# Patient Record
Sex: MALE | Race: WHITE | Employment: FULL TIME | URBAN - NONMETROPOLITAN AREA
[De-identification: names, ages, dates, MRNs, and addresses within clinical notes are randomized per-mention and may not be internally consistent; named-entity substitution may affect disease eponyms.]

---

## 2022-03-26 ENCOUNTER — HOSPITAL ENCOUNTER (EMERGENCY)
Age: 57
Discharge: LEFT BEFORE DISCHARGE | End: 2022-03-26
Attending: EMERGENCY MEDICINE
Payer: COMMERCIAL

## 2022-03-26 ENCOUNTER — APPOINTMENT (OUTPATIENT)
Dept: GENERAL RADIOLOGY | Age: 57
End: 2022-03-26
Payer: COMMERCIAL

## 2022-03-26 ENCOUNTER — APPOINTMENT (OUTPATIENT)
Dept: CT IMAGING | Age: 57
End: 2022-03-26
Payer: COMMERCIAL

## 2022-03-26 ENCOUNTER — HOSPITAL ENCOUNTER (EMERGENCY)
Age: 57
Discharge: HOME | End: 2022-03-27
Attending: EMERGENCY MEDICINE
Payer: COMMERCIAL

## 2022-03-26 VITALS
HEART RATE: 104 BPM | RESPIRATION RATE: 18 BRPM | DIASTOLIC BLOOD PRESSURE: 94 MMHG | SYSTOLIC BLOOD PRESSURE: 134 MMHG | OXYGEN SATURATION: 97 %

## 2022-03-26 VITALS
DIASTOLIC BLOOD PRESSURE: 85 MMHG | RESPIRATION RATE: 21 BRPM | TEMPERATURE: 98 F | OXYGEN SATURATION: 97 % | HEART RATE: 86 BPM | SYSTOLIC BLOOD PRESSURE: 132 MMHG

## 2022-03-26 DIAGNOSIS — R77.8 ELEVATED TROPONIN: ICD-10-CM

## 2022-03-26 DIAGNOSIS — R42 DIZZINESS: Primary | ICD-10-CM

## 2022-03-26 LAB
ABSOLUTE EOS #: 0.19 K/UL (ref 0–0.44)
ABSOLUTE IMMATURE GRANULOCYTE: <0.03 K/UL (ref 0–0.3)
ABSOLUTE LYMPH #: 2.43 K/UL (ref 1.1–3.7)
ABSOLUTE MONO #: 0.47 K/UL (ref 0.1–1.2)
ALBUMIN SERPL-MCNC: 4.5 G/DL (ref 3.5–5.2)
ALBUMIN/GLOBULIN RATIO: 1.6 (ref 1–2.5)
ALP BLD-CCNC: 73 U/L (ref 40–129)
ALT SERPL-CCNC: 6 U/L (ref 5–41)
AMPHETAMINE SCREEN URINE: NEGATIVE
ANION GAP SERPL CALCULATED.3IONS-SCNC: 11 MMOL/L (ref 9–17)
AST SERPL-CCNC: 11 U/L
BARBITURATE SCREEN URINE: NEGATIVE
BASOPHILS # BLD: 1 % (ref 0–2)
BASOPHILS ABSOLUTE: 0.06 K/UL (ref 0–0.2)
BENZODIAZEPINE SCREEN, URINE: NEGATIVE
BILIRUB SERPL-MCNC: 0.35 MG/DL (ref 0.3–1.2)
BUN BLDV-MCNC: 16 MG/DL (ref 6–20)
BUN/CREAT BLD: 13 (ref 9–20)
BUPRENORPHINE URINE: NEGATIVE
CALCIUM SERPL-MCNC: 9.6 MG/DL (ref 8.6–10.4)
CANNABINOID SCREEN URINE: NEGATIVE
CHLORIDE BLD-SCNC: 99 MMOL/L (ref 98–107)
CO2: 28 MMOL/L (ref 20–31)
COCAINE METABOLITE, URINE: NEGATIVE
CREAT SERPL-MCNC: 1.27 MG/DL (ref 0.7–1.2)
D-DIMER QUANTITATIVE: 0.67 MG/L FEU (ref 0–0.59)
EKG ATRIAL RATE: 98 BPM
EKG P AXIS: 38 DEGREES
EKG P-R INTERVAL: 162 MS
EKG Q-T INTERVAL: 372 MS
EKG QRS DURATION: 84 MS
EKG QTC CALCULATION (BAZETT): 474 MS
EKG R AXIS: -6 DEGREES
EKG T AXIS: -32 DEGREES
EKG VENTRICULAR RATE: 98 BPM
EOSINOPHILS RELATIVE PERCENT: 2 % (ref 1–4)
GFR AFRICAN AMERICAN: >60 ML/MIN
GFR NON-AFRICAN AMERICAN: 58 ML/MIN
GFR SERPL CREATININE-BSD FRML MDRD: ABNORMAL ML/MIN/{1.73_M2}
GFR SERPL CREATININE-BSD FRML MDRD: ABNORMAL ML/MIN/{1.73_M2}
GLUCOSE BLD-MCNC: 116 MG/DL (ref 70–99)
HCT VFR BLD CALC: 44.9 % (ref 40.7–50.3)
HEMOGLOBIN: 15.3 G/DL (ref 13–17)
IMMATURE GRANULOCYTES: 0 %
INR BLD: 0.9
INR BLD: 1
LYMPHOCYTES # BLD: 28 % (ref 24–43)
MAGNESIUM: 2.1 MG/DL (ref 1.6–2.6)
MCH RBC QN AUTO: 30.2 PG (ref 25.2–33.5)
MCHC RBC AUTO-ENTMCNC: 34.1 G/DL (ref 28.4–34.8)
MCV RBC AUTO: 88.7 FL (ref 82.6–102.9)
METHADONE SCREEN, URINE: NEGATIVE
METHAMPHETAMINE, URINE: NEGATIVE
MONOCYTES # BLD: 5 % (ref 3–12)
NRBC AUTOMATED: 0 PER 100 WBC
OPIATES, URINE: NEGATIVE
OXYCODONE SCREEN URINE: NEGATIVE
PDW BLD-RTO: 13.1 % (ref 11.8–14.4)
PHENCYCLIDINE, URINE: NEGATIVE
PLATELET # BLD: 343 K/UL (ref 138–453)
PMV BLD AUTO: 9.4 FL (ref 8.1–13.5)
POTASSIUM SERPL-SCNC: 3.5 MMOL/L (ref 3.7–5.3)
PRO-BNP: 956 PG/ML
PROPOXYPHENE, URINE: NEGATIVE
PROTHROMBIN TIME: 11.9 SEC (ref 11.5–14.2)
PROTHROMBIN TIME: 12.7 SEC (ref 11.5–14.2)
RBC # BLD: 5.06 M/UL (ref 4.21–5.77)
SEG NEUTROPHILS: 64 % (ref 36–65)
SEGMENTED NEUTROPHILS ABSOLUTE COUNT: 5.62 K/UL (ref 1.5–8.1)
SODIUM BLD-SCNC: 138 MMOL/L (ref 135–144)
TOTAL PROTEIN: 7.3 G/DL (ref 6.4–8.3)
TRICYCLIC ANTIDEPRESSANTS, UR: NEGATIVE
TROPONIN, HIGH SENSITIVITY: 154 NG/L (ref 0–22)
TROPONIN, HIGH SENSITIVITY: 155 NG/L (ref 0–22)
TROPONIN, HIGH SENSITIVITY: 161 NG/L (ref 0–22)
TSH SERPL DL<=0.05 MIU/L-ACNC: 0.47 MIU/L (ref 0.3–5)
WBC # BLD: 8.8 K/UL (ref 3.5–11.3)

## 2022-03-26 PROCEDURE — 83880 ASSAY OF NATRIURETIC PEPTIDE: CPT

## 2022-03-26 PROCEDURE — 71046 X-RAY EXAM CHEST 2 VIEWS: CPT

## 2022-03-26 PROCEDURE — 70450 CT HEAD/BRAIN W/O DYE: CPT

## 2022-03-26 PROCEDURE — 85610 PROTHROMBIN TIME: CPT

## 2022-03-26 PROCEDURE — 96374 THER/PROPH/DIAG INJ IV PUSH: CPT

## 2022-03-26 PROCEDURE — 71260 CT THORAX DX C+: CPT

## 2022-03-26 PROCEDURE — 99283 EMERGENCY DEPT VISIT LOW MDM: CPT

## 2022-03-26 PROCEDURE — 80053 COMPREHEN METABOLIC PANEL: CPT

## 2022-03-26 PROCEDURE — 84443 ASSAY THYROID STIM HORMONE: CPT

## 2022-03-26 PROCEDURE — 36415 COLL VENOUS BLD VENIPUNCTURE: CPT

## 2022-03-26 PROCEDURE — 93005 ELECTROCARDIOGRAM TRACING: CPT | Performed by: PHYSICIAN ASSISTANT

## 2022-03-26 PROCEDURE — 93005 ELECTROCARDIOGRAM TRACING: CPT | Performed by: EMERGENCY MEDICINE

## 2022-03-26 PROCEDURE — 2580000003 HC RX 258: Performed by: PHYSICIAN ASSISTANT

## 2022-03-26 PROCEDURE — 85025 COMPLETE CBC W/AUTO DIFF WBC: CPT

## 2022-03-26 PROCEDURE — 6360000002 HC RX W HCPCS: Performed by: PHYSICIAN ASSISTANT

## 2022-03-26 PROCEDURE — 96375 TX/PRO/DX INJ NEW DRUG ADDON: CPT

## 2022-03-26 PROCEDURE — 84484 ASSAY OF TROPONIN QUANT: CPT

## 2022-03-26 PROCEDURE — 80306 DRUG TEST PRSMV INSTRMNT: CPT

## 2022-03-26 PROCEDURE — 6360000004 HC RX CONTRAST MEDICATION: Performed by: PHYSICIAN ASSISTANT

## 2022-03-26 PROCEDURE — 85379 FIBRIN DEGRADATION QUANT: CPT

## 2022-03-26 PROCEDURE — 99284 EMERGENCY DEPT VISIT MOD MDM: CPT

## 2022-03-26 PROCEDURE — 83735 ASSAY OF MAGNESIUM: CPT

## 2022-03-26 RX ORDER — ONDANSETRON 2 MG/ML
4 INJECTION INTRAMUSCULAR; INTRAVENOUS ONCE
Status: COMPLETED | OUTPATIENT
Start: 2022-03-26 | End: 2022-03-26

## 2022-03-26 RX ORDER — MIRTAZAPINE 15 MG/1
15 TABLET, FILM COATED ORAL DAILY
COMMUNITY

## 2022-03-26 RX ORDER — 0.9 % SODIUM CHLORIDE 0.9 %
1000 INTRAVENOUS SOLUTION INTRAVENOUS ONCE
Status: COMPLETED | OUTPATIENT
Start: 2022-03-26 | End: 2022-03-26

## 2022-03-26 RX ORDER — BUSPIRONE HYDROCHLORIDE 5 MG/1
5 TABLET ORAL DAILY
COMMUNITY

## 2022-03-26 RX ORDER — DIPHENHYDRAMINE HYDROCHLORIDE 50 MG/ML
25 INJECTION INTRAMUSCULAR; INTRAVENOUS ONCE
Status: COMPLETED | OUTPATIENT
Start: 2022-03-26 | End: 2022-03-26

## 2022-03-26 RX ADMIN — DIPHENHYDRAMINE HYDROCHLORIDE 25 MG: 50 INJECTION, SOLUTION INTRAMUSCULAR; INTRAVENOUS at 15:31

## 2022-03-26 RX ADMIN — ONDANSETRON 4 MG: 2 INJECTION INTRAMUSCULAR; INTRAVENOUS at 15:30

## 2022-03-26 RX ADMIN — SODIUM CHLORIDE 999 ML: 9 INJECTION, SOLUTION INTRAVENOUS at 15:29

## 2022-03-26 RX ADMIN — IOPAMIDOL 75 ML: 755 INJECTION, SOLUTION INTRAVENOUS at 16:05

## 2022-03-26 ASSESSMENT — ENCOUNTER SYMPTOMS
EYES NEGATIVE: 1
GASTROINTESTINAL NEGATIVE: 1

## 2022-03-26 NOTE — ED PROVIDER NOTES
[facility] ED  EMERGENCY DEPARTMENT ENCOUNTER      Pt Name: Donnie Caldwell  MRN: [id number]  [place] 1965  Date of evaluation: 3/26/2022  Provider: JAVON Pride PA-C    CHIEF COMPLAINT     Chief Complaint   Patient presents with    Dizziness     ongoing for years, worse since mon         HISTORY OF PRESENT ILLNESS   (Location/Symptom, Timing/Onset, Context/Setting,Quality, Duration, Modifying Factors, Severity)  Note limiting factors. Donnie Caldwell is a62 y.o. male who presents to the emergency department      76-year-old male presents here with a chief complaint of a 1 week history of dizziness. Patient states he also has a history of dizziness but in the last week he is got progressively worse. Walking through [place] today felt as if he was going to pass out and not make it. He presents here with a chief complaint of dizziness. Denies any chest pain or shortness of breath. Family states he had a recent history of an upper respiratory infection Covid was negative at that time. Patient also has a history of hypertension. States she also had a stroke at a young age. He is not currently on any anticoagulant therapy. Patient is afebrile vital signs are stable. Nursing Notes werereviewed. REVIEW OF SYSTEMS    (2-9 systems for level 4, 10 or more for level 5)     Review of Systems   Constitutional: Negative. HENT: Negative. Eyes: Negative. Cardiovascular: Negative. Gastrointestinal: Negative. Endocrine: Negative. Genitourinary: Negative. Musculoskeletal: Negative. Neurological: Positive for dizziness and light-headedness. Negative for speech difficulty. Psychiatric/Behavioral: Negative. All other systems reviewed and are negative. Except as noted above the remainder of the review of systems was reviewed and negative.        PAST MEDICAL HISTORY     Past Medical History:   Diagnosis Date    Anxiety     Hypertension     Stroke (cerebrum) ([facility] [address] SURGICALHISTORY     No past surgical history on file. CURRENT MEDICATIONS       Previous Medications    BUSPIRONE (BUSPAR) 5 MG TABLET    Take 5 mg by mouth 3 times daily    LOSARTAN POTASSIUM PO    Take by mouth    MIRTAZAPINE (REMERON) 15 MG TABLET    Take 15 mg by mouth nightly         ALLERGIES   Patient has no known allergies. FAMILY HISTORY     No family history on file. SOCIAL HISTORY       Social History     Socioeconomic History    Marital status: Unknown     Spouse name: Not on file    Number of children: Not on file    Years of education: Not on file    Highest education level: Not on file   Occupational History    Not on file   Tobacco Use    Smoking status: Current Every Day Smoker     Packs/day: 1.00    Smokeless tobacco: Never Used   Vaping Use    Vaping Use: Never used   Substance and Sexual Activity    Alcohol use: Not Currently    Drug use: Not on file    Sexual activity: Yes   Other Topics Concern    Not on file   Social History Narrative    Not on file     Social Determinants of Health     Financial Resource Strain:     Difficulty of Paying Living Expenses: Not on file   Food Insecurity:     Worried About Running Out of Food in the Last Year: Not on file    Jeannette of Food in the Last Year: Not on file   Transportation Needs:     Lack of Transportation (Medical): Not on file    Lack of Transportation (Non-Medical):  Not on file   Physical Activity:     Days of Exercise per Week: Not on file    Minutes of Exercise per Session: Not on file   Stress:     Feeling of Stress : Not on file   Social Connections:     Frequency of Communication with Friends and Family: Not on file    Frequency of Social Gatherings with Friends and Family: Not on file    Attends [employer] Services: Not on file    Active Member of Clubs or Organizations: Not on file    Attends Club or Organization Meetings: Not on file    Marital Status: Not on file   Intimate Partner Violence:  Fear of Current or Ex-Partner: Not on file    Emotionally Abused: Not on file    Physically Abused: Not on file    Sexually Abused: Not on file   Housing Stability:     Unable to Pay for Housing in the Last Year: Not on file    Number of Places Lived in the Last Year: Not on file    Unstable Housing in the Last Year: Not on file       SCREENINGS    [place] Coma Scale  Eye Opening: Spontaneous  Best Verbal Response: Oriented  Best Motor Response: Obeys commands  [place] Coma Scale Score: 15        PHYSICAL EXAM    (up to 7 for level 4, 8 or more for level 5)     ED Triage Vitals [03/26/22 1442]   BP Temp Temp src Pulse Resp SpO2 Height Weight   (!) 182/116 -- -- 105 18 100 % -- --       Physical Exam  Vitals and nursing note reviewed. Constitutional:       Appearance: Normal appearance. HENT:      Head: Normocephalic and atraumatic. Right Ear: Tympanic membrane and ear canal normal.      Left Ear: Tympanic membrane and ear canal normal.      Nose: Nose normal.      Mouth/Throat:      Mouth: Mucous membranes are dry. Pharynx: Oropharynx is clear. Eyes:      Extraocular Movements: Extraocular movements intact. Conjunctiva/sclera: Conjunctivae normal.      Pupils: Pupils are equal, round, and reactive to light. Cardiovascular:      Rate and Rhythm: Normal rate and regular rhythm. Pulmonary:      Effort: Pulmonary effort is normal.      Breath sounds: Normal breath sounds. Abdominal:      General: Abdomen is flat. Bowel sounds are normal.      Palpations: Abdomen is soft. Musculoskeletal:         General: Normal range of motion. Cervical back: Normal range of motion and neck supple. Skin:     General: Skin is warm and dry. Capillary Refill: Capillary refill takes 2 to 3 seconds. Neurological:      General: No focal deficit present. Mental Status: He is alert and oriented to person, place, and time. Mental status is at baseline.    Psychiatric:         Mood and Affect: Mood normal.         Behavior: Behavior normal.         Thought Content: Thought content normal.         Judgment: Judgment normal.         DIAGNOSTIC RESULTS     EKG: All EKG's are interpreted by the Emergency Department Physician who either signs orCo-signs this chart in the absence of a cardiologist.    1447 EKG shows normal sinus rhythm with a rate of 95 bpm CA interval 164 ms QRS duration 90 ms, occasional PVC  RADIOLOGY:   Non-plainfilm images such as CT, Ultrasound and MRI are read by the radiologist. Plain radiographic images are visualized and preliminarily interpreted by the emergency physician with the below findings:      Interpretationper the Radiologist below, if available at the time of this note:    CT CHEST PULMONARY EMBOLISM W CONTRAST   Final Result   No evidence of pulmonary embolism or acute pulmonary abnormality. CT Head WO Contrast   Final Result   No acute intracranial abnormality. XR CHEST (2 VW)   Final Result   No acute process.                ED BEDSIDE ULTRASOUND:   Performed by ED Physician - none    LABS:  Labs Reviewed   COMPREHENSIVE METABOLIC PANEL W/ REFLEX TO MG FOR LOW K - Abnormal; Notable for the following components:       Result Value    Glucose 116 (*)     CREATININE 1.27 (*)     Potassium 3.5 (*)     GFR Non- 58 (*)     All other components within normal limits   TROPONIN - Abnormal; Notable for the following components:    Troponin, High Sensitivity 161 (*)     All other components within normal limits   D-DIMER, QUANTITATIVE - Abnormal; Notable for the following components:    D-Dimer, Quant 0.67 (*)     All other components within normal limits   TROPONIN - Abnormal; Notable for the following components:    Troponin, High Sensitivity 154 (*)     All other components within normal limits   BRAIN NATRIURETIC PEPTIDE - Abnormal; Notable for the following components:    Pro- (*)     All other components within normal limits TROPONIN - Abnormal; Notable for the following components:    Troponin, High Sensitivity 155 (*)     All other components within normal limits   CBC WITH AUTO DIFFERENTIAL   PROTIME-INR   MAGNESIUM   PROTIME-INR   URINE DRUG SCREEN   TSH       All other labs were within normal range or not returned as of this dictation. EMERGENCY DEPARTMENT COURSE and DIFFERENTIAL DIAGNOSIS/MDM:   Vitals:    Vitals:    03/26/22 1442 03/26/22 1719 03/26/22 1720   BP: (!) 182/116 119/73    Pulse: 105 85    Resp: 18     SpO2: 100%  98%         MDM  Number of Diagnoses or Management Options  Dizziness  Elevated troponin  Diagnosis management comments: 64-year-old male presents here with a chief complaint of a 1 week history of dizziness. Patient states he also has a history of dizziness but in the last week he is got progressively worse. Walking through [facility] today felt as if he was going to pass out and not make it. He presents here with a chief complaint of dizziness. Denies any chest pain or shortness of breath. Family states he had a recent history of an upper respiratory infection Covid was negative at that time. Patient also has a history of hypertension. States she also had a stroke at a young age. He is not currently on any anticoagulant therapy. Patient is afebrile vital signs are stable. Presented to the emergency room with worsening dizziness over the last week he did had 3 sets of troponin levels performed. All other blood work was within normal limits. Troponin levels were elevated 160, 154 155. During the course of the stay here in the emergency room I did talk to cardiologist Dr. Naveed Taylor. Also during the course of his stay patient was notified that his sister became acutely ill and was being placed on a ventilator another facility was not going to make it. Patient wished to be discharged so that he go and see his family member. Patient did agree to sign out [address]   I made him aware that his symptoms may lead to death. He may have had a recent heart attack and may have worsening symptoms. Patient states she does feel better after Benadryl and fluids here does not feel as dizzy. He wishes to be discharged [address] and he will follow up with Dr. True Moise. Procedures    FINAL IMPRESSION      1. Dizziness    2. Elevated troponin        DISPOSITION/PLAN   DISPOSITION        PATIENT REFERRED TO:  Richie Dudley MD  [address] [phone]    In 1 day        DISCHARGE MEDICATIONS:  New Prescriptions    No medications on file              Summation      Patient Course:      ED Medications administered this visit:    Medications   0.9 % sodium chloride bolus (0 mLs IntraVENous Stopped 3/26/22 1659)   diphenhydrAMINE (BENADRYL) injection 25 mg (25 mg IntraVENous Given 3/26/22 1531)   ondansetron (ZOFRAN) injection 4 mg (4 mg IntraVENous Given 3/26/22 1530)   iopamidol (ISOVUE-370) 76 % injection 75 mL (75 mLs IntraVENous Given 3/26/22 1605)       New Prescriptions from this visit:    New Prescriptions    No medications on file       Follow-up:  Richie Dudley MD  [address] [place] [address] [place] [id number] [phone]    In 1 day          Final Impression:   1. Dizziness    2.  Elevated troponin               (Please note that portions of this note were completed with a voice recognition program.  Efforts were made to edit the dictations but occasionally words are mis-transcribed.)           Clarissa Russo PA-C  03/26/22 1942

## 2022-03-26 NOTE — ED NOTES
Contacted Dr Snehal Soliman per A Aminta request.     [facility] ABDELRAHMAN Reser  03/26/22 5234

## 2022-03-27 LAB
EKG ATRIAL RATE: 95 BPM
EKG ATRIAL RATE: 98 BPM
EKG P AXIS: 38 DEGREES
EKG P AXIS: 50 DEGREES
EKG P-R INTERVAL: 162 MS
EKG P-R INTERVAL: 164 MS
EKG Q-T INTERVAL: 372 MS
EKG Q-T INTERVAL: 378 MS
EKG QRS DURATION: 84 MS
EKG QRS DURATION: 90 MS
EKG QTC CALCULATION (BAZETT): 474 MS
EKG QTC CALCULATION (BAZETT): 475 MS
EKG R AXIS: -4 DEGREES
EKG R AXIS: -6 DEGREES
EKG T AXIS: -19 DEGREES
EKG T AXIS: -32 DEGREES
EKG VENTRICULAR RATE: 95 BPM
EKG VENTRICULAR RATE: 98 BPM

## 2022-03-27 PROCEDURE — 93010 ELECTROCARDIOGRAM REPORT: CPT | Performed by: INTERNAL MEDICINE

## 2022-03-27 ASSESSMENT — ENCOUNTER SYMPTOMS
SORE THROAT: 0
ABDOMINAL PAIN: 0
SHORTNESS OF BREATH: 0
NAUSEA: 0
VOMITING: 0
COLOR CHANGE: 0
COUGH: 0
PHOTOPHOBIA: 0

## 2022-03-27 NOTE — ED PROVIDER NOTES
[facility] [place] ED  eMERGENCY dEPARTMENT eNCOUnter      Pt Name: Dustin Dove  MRN: [id number]  [place] 1965  Date of evaluation: 3/26/2022  Provider: Renan Singer, [facility]       Chief Complaint   Patient presents with    Chest Pain         HISTORY OF PRESENT ILLNESS   (Location/Symptom, Timing/Onset, Context/Setting, Quality, Duration, Modifying Factors, Severity) Note limiting factors. HPI    Dustin Doev is a 59 y.o. male who presents to the emergency department with abnormal lab. Patient was seen in the ER earlier today secondary to dizziness. At that time he underwent a complete work-up with CT of his head and chest.  EKG and blood work were obtained at that time as well. The only abnormality was that his initial troponin was 160. Repeat troponins were 154 and 155 indicating a flat or stable value to the troponin. Reportedly the patient was offered admission because of this abnormal value but his sister was at another facility in respiratory distress and there was concern she would pair so he signed out [address] Patient reports that his sister ended up dying and therefore he felt like he should take care of himself and because of the recent visit offering admission returns at this time. Patient does states that his symptoms have been improved since his initial evaluation and he still denies any chest pain or shortness of breath    Nursing Notes were reviewed. REVIEW OF SYSTEMS    (2+ for level 4; 10+ for level 5)   Review of Systems   Constitutional: Negative for chills and fever. HENT: Negative for congestion and sore throat. Eyes: Negative for photophobia and visual disturbance. Respiratory: Negative for cough and shortness of breath. Cardiovascular: Negative for chest pain. Gastrointestinal: Negative for abdominal pain, nausea and vomiting. Musculoskeletal: Negative for neck pain and neck stiffness. Skin: Negative for color change. Neurological: Positive for dizziness. Negative for headaches. Hematological: Does not bruise/bleed easily. All other systems reviewed and are negative. PAST MEDICAL HISTORY     Past Medical History:   Diagnosis Date    Anxiety     Hypertension     Stroke (cerebrum) ([facility] [address]        SURGICAL HISTORY     History reviewed. No pertinent surgical history. CURRENT MEDICATIONS       Discharge Medication List as of 3/27/2022 12:28 AM      CONTINUE these medications which have NOT CHANGED    Details   busPIRone (BUSPAR) 5 MG tablet Take 5 mg by mouth 3 times dailyHistorical Med      LOSARTAN POTASSIUM PO Take by mouthHistorical Med      mirtazapine (REMERON) 15 MG tablet Take 15 mg by mouth nightlyHistorical Med             ALLERGIES     Patient has no known allergies. FAMILY HISTORY     History reviewed. No pertinent family history. SOCIAL HISTORY       Social History     Socioeconomic History    Marital status: Unknown     Spouse name: None    Number of children: None    Years of education: None    Highest education level: None   Occupational History    None   Tobacco Use    Smoking status: Current Every Day Smoker     Packs/day: 1.00    Smokeless tobacco: Never Used   Vaping Use    Vaping Use: Never used   Substance and Sexual Activity    Alcohol use: Not Currently    Drug use: None    Sexual activity: Yes   Other Topics Concern    None   Social History Narrative    None     Social Determinants of Health     Financial Resource Strain:     Difficulty of Paying Living Expenses: Not on file   Food Insecurity:     Worried About Running Out of Food in the Last Year: Not on file    Jeannette of Food in the Last Year: Not on file   Transportation Needs:     Lack of Transportation (Medical): Not on file    Lack of Transportation (Non-Medical):  Not on file   Physical Activity:     Days of Exercise per Week: Not on file    Minutes of Exercise per Session: Not on file   Stress:     Feeling of Stress : Not on file   Social Connections:     Frequency of Communication with Friends and Family: Not on file    Frequency of Social Gatherings with Friends and Family: Not on file    Attends Mormonism Services: Not on file    Active Member of Clubs or Organizations: Not on file    Attends Club or Organization Meetings: Not on file    Marital Status: Not on file   Intimate Partner Violence:     Fear of Current or Ex-Partner: Not on file    Emotionally Abused: Not on file    Physically Abused: Not on file    Sexually Abused: Not on file   Housing Stability:     Unable to Pay for Housing in the Last Year: Not on file    Number of [place] in the Last Year: Not on file    Unstable Housing in the Last Year: Not on file       SCREENINGS    Debra Coma Scale  Eye Opening: Spontaneous  Best Verbal Response: Oriented  Best Motor Response: Obeys commands  Debra Coma Scale Score: 15      PHYSICAL EXAM    (up to 7 for level 4, 8 or more for level 5)   [facility]    [facility] Exam  Vitals and nursing note reviewed. Constitutional:       General: He is not in acute distress. Appearance: Normal appearance. He is not ill-appearing, toxic-appearing or diaphoretic. HENT:      Head: Normocephalic and atraumatic. Right Ear: Tympanic membrane, ear canal and external ear normal.      Left Ear: Tympanic membrane, ear canal and external ear normal.      Nose: Nose normal.      Mouth/Throat:      Mouth: Mucous membranes are moist.      Pharynx: Oropharynx is clear. No oropharyngeal exudate or posterior oropharyngeal erythema. Eyes:      General: No scleral icterus. Right eye: No discharge. Left eye: No discharge. Extraocular Movements: Extraocular movements intact. Conjunctiva/sclera: Conjunctivae normal.      Pupils: Pupils are equal, round, and reactive to light. Neck:      Vascular: No carotid bruit. Cardiovascular:      Rate and Rhythm: Normal rate and regular rhythm. Pulses: Normal pulses. Heart sounds: Normal heart sounds. No murmur heard. No friction rub. No gallop. Comments: Radial pulses are +2-4 bilaterally are equal and symmetric  Pulmonary:      Effort: Pulmonary effort is normal. No respiratory distress. Breath sounds: Normal breath sounds. No wheezing, rhonchi or rales. Chest:      Chest wall: No tenderness. Abdominal:      General: Abdomen is flat. Bowel sounds are normal. There is no distension. Palpations: Abdomen is soft. Tenderness: There is no abdominal tenderness. There is no guarding. Hernia: No hernia is present. Musculoskeletal:         General: No swelling, tenderness, deformity or signs of injury. Normal range of motion. Cervical back: Normal range of motion and neck supple. No rigidity or tenderness. Skin:     General: Skin is warm and dry. Capillary Refill: Capillary refill takes less than 2 seconds. Findings: No erythema or rash. Comments: Skin turgor is normal   Neurological:      General: No focal deficit present. Mental Status: He is alert and oriented to person, place, and time. Cranial Nerves: No cranial nerve deficit. Sensory: No sensory deficit. Comments: Cranial nerves II through XII are grossly intact there are no focal neurologic deficits. No pronator drift no dysmetria no truncal ataxia. Negative Hallpike [place] exam.  No nystagmus noted. NIH stroke scale score of 0   Psychiatric:         Mood and Affect: Mood normal.         Behavior: Behavior normal.         Thought Content: Thought content normal.         Judgment: Judgment normal.         DIAGNOSTIC RESULTS     EKG (Per Emergency Physician):   EKG shows normal sinus rhythm at a rate of 98 with nonspecific T wave inversion in lead III and aVF but otherwise no acute current of injury or dysrhythmia change. QTC is 474 and ID interval is normal at 162    RADIOLOGY (Per Emergency Physician):      Interpretation per the Radiologist below, if available at the time of this note:  XR CHEST (2 VW)    Result Date: 3/26/2022  EXAMINATION: TWO XRAY VIEWS OF THE CHEST 3/26/2022 12:17 pm COMPARISON: None. HISTORY: ORDERING SYSTEM PROVIDED HISTORY: dizziness TECHNOLOGIST PROVIDED HISTORY: dizziness FINDINGS: The lungs are without acute focal process. There is no effusion or pneumothorax. The cardiomediastinal silhouette is without acute process. The osseous structures are without acute process. No acute process. CT Head WO Contrast    Result Date: 3/26/2022  EXAMINATION: CT OF THE HEAD WITHOUT CONTRAST  3/26/2022 3:20 pm TECHNIQUE: CT of the head was performed without the administration of intravenous contrast. Dose modulation, iterative reconstruction, and/or weight based adjustment of the mA/kV was utilized to reduce the radiation dose to as low as reasonably achievable. COMPARISON: None. HISTORY: ORDERING SYSTEM PROVIDED HISTORY: dizziness TECHNOLOGIST PROVIDED HISTORY: dizziness Decision Support Exception - unselect if not a suspected or confirmed emergency medical condition->Emergency Medical Condition (MA) FINDINGS: BRAIN/VENTRICLES: There is no acute intracranial hemorrhage, mass effect or midline shift. No abnormal extra-axial fluid collection. The gray-white differentiation is maintained without evidence of an acute infarct. There is no evidence of hydrocephalus. Small area of decreased density in the left frontal white matter consistent with encephalomalacia. Small old lacunar infarct in the right basal ganglion. ORBITS: The visualized portion of the orbits demonstrate no acute abnormality. SINUSES: The visualized paranasal sinuses and mastoid air cells demonstrate no acute abnormality. SOFT TISSUES/SKULL:  No acute abnormality of the visualized skull or soft tissues. No acute intracranial abnormality.      CT CHEST PULMONARY EMBOLISM W CONTRAST    Result Date: 3/26/2022  EXAMINATION: CTA OF THE CHEST 3/26/2022 4:04 pm TECHNIQUE: CTA of the chest was performed after the administration of intravenous contrast.  Multiplanar reformatted images are provided for review. MIP images are provided for review. Dose modulation, iterative reconstruction, and/or weight based adjustment of the mA/kV was utilized to reduce the radiation dose to as low as reasonably achievable. COMPARISON: None. HISTORY: ORDERING SYSTEM PROVIDED HISTORY: dizziness TECHNOLOGIST PROVIDED HISTORY: dizziness FINDINGS: Pulmonary Arteries: Pulmonary arteries show no evidence of intraluminal filling defect to suggest pulmonary embolism. Main pulmonary artery is normal in caliber. Mediastinum: No evidence of mediastinal lymphadenopathy. The heart and pericardium demonstrate no acute abnormality. There is no acute abnormality of the thoracic aorta. Lungs/pleura: The lungs are without acute process. No focal consolidation or pulmonary edema. No evidence of pleural effusion or pneumothorax. Upper Abdomen: Limited images of the upper abdomen are unremarkable. Soft Tissues/Bones: No acute bone or soft tissue abnormality. No evidence of pulmonary embolism or acute pulmonary abnormality. ED BEDSIDE ULTRASOUND:   Performed by ED Physician - none    LABS:  Labs Reviewed - No data to display     All other labs were within normal range or not returned as of this dictation. EMERGENCY DEPARTMENT COURSE and DIFFERENTIAL DIAGNOSIS/MDM:   Vitals:    Vitals:    03/26/22 2245 03/26/22 2300 03/26/22 2330 03/26/22 2354   BP: (!) 141/75 139/74 132/85    Pulse: 92 95 86    Resp: 22 20 20 21   Temp:       TempSrc:       SpO2: 96% 97% 96% 97%       Medications - No data to display    MDM. Patient presented to the ER afebrile normotensive and with no complaint of chest pain or shortness of breath. He had undergone a complete evaluation with CTA of his chest CT of his head and blood work earlier in the day. Therefore I felt no need to repeat this.   A repeat EKG was obtained because of his recent elevated troponins and this shows no obvious signs of ischemia or MI and this is similar nature to his EKG from approximately 3 PM today. I discussed the case with the hospitalist.  Unfortunate this time as the patient is not having any type of chest pain or neurologic deficit he does not feel there is need for emergent admission to undergo further cardiac testing. He does agree that testing needs to be performed but can be done on an outpatient basis. Therefore this time the patient will be discharged and he will be given cardiology to follow-up with so that he can obtain his outpatient stress test and echo and was advised to return to the ER should symptoms worsen. Patient states he is comfortable with this plan and therefore be discharged at this time    REVAL:         V[address] time was minutes, excluding separately reportable procedures. There was a high probability of clinically significant/life threatening deterioration in the patient's condition which required my urgent intervention. CONSULTS:  None    PROCEDURES:  Unless otherwise noted below, none     Procedures    FINAL IMPRESSION      1. Dizziness    2. Elevated troponin          DISPOSITION/PLAN   DISPOSITION Decision To Discharge 03/27/2022 12:26:39 AM      PATIENT REFERRED TO:  MD Ye[address]  [facility] [address] [phone]    Schedule an appointment as soon as possible for a visit in 2 days  For repeat evaluation      DISCHARGE MEDICATIONS:  Discharge Medication List as of 3/27/2022 12:28 AM             (Please note:  Portions of this note were completed with a voice recognition program.  Efforts were made to edit the dictations but occasionally words and phrases are mis-transcribed.)  Form v2016. J.[id number]    José Luis Ley DO (electronically signed)  Emergency Medicine Provider       DO Teto  03/27/22 0100

## 2022-03-27 NOTE — ED TRIAGE NOTES
Pt recently at this ER and left AMA to say goodbye to ill sister. Pt back to continue the plan of admission for stress test. Pt denies any new or worsening symptoms since discharge.

## 2022-04-13 ENCOUNTER — HOSPITAL ENCOUNTER (OUTPATIENT)
Dept: NON INVASIVE DIAGNOSTICS | Age: 57
Discharge: HOME | End: 2022-04-13
Payer: COMMERCIAL

## 2022-04-13 ENCOUNTER — OFFICE VISIT (OUTPATIENT)
Dept: CARDIOLOGY | Age: 57
End: 2022-04-13
Payer: COMMERCIAL

## 2022-04-13 ENCOUNTER — HOSPITAL ENCOUNTER (OUTPATIENT)
Age: 57
Discharge: HOME | End: 2022-04-13
Payer: COMMERCIAL

## 2022-04-13 VITALS
HEART RATE: 72 BPM | SYSTOLIC BLOOD PRESSURE: 134 MMHG | HEIGHT: 61 IN | BODY MASS INDEX: 25.71 KG/M2 | DIASTOLIC BLOOD PRESSURE: 73 MMHG | WEIGHT: 136.2 LBS | OXYGEN SATURATION: 98 % | RESPIRATION RATE: 18 BRPM

## 2022-04-13 DIAGNOSIS — R53.83 FATIGUE, UNSPECIFIED TYPE: ICD-10-CM

## 2022-04-13 DIAGNOSIS — R07.9 CHEST PAIN, UNSPECIFIED TYPE: ICD-10-CM

## 2022-04-13 DIAGNOSIS — R00.2 PALPITATIONS: ICD-10-CM

## 2022-04-13 DIAGNOSIS — R77.8 ELEVATED TROPONIN: ICD-10-CM

## 2022-04-13 DIAGNOSIS — Z72.0 TOBACCO ABUSE: ICD-10-CM

## 2022-04-13 DIAGNOSIS — R42 LIGHTHEADEDNESS: ICD-10-CM

## 2022-04-13 DIAGNOSIS — I10 ESSENTIAL HYPERTENSION: ICD-10-CM

## 2022-04-13 DIAGNOSIS — R42 DIZZINESS: ICD-10-CM

## 2022-04-13 LAB
CHOLESTEROL/HDL RATIO: 4.5
CHOLESTEROL: 198 MG/DL
HDLC SERPL-MCNC: 44 MG/DL
LDL CHOLESTEROL: 124 MG/DL (ref 0–130)
TRIGL SERPL-MCNC: 152 MG/DL

## 2022-04-13 PROCEDURE — 36415 COLL VENOUS BLD VENIPUNCTURE: CPT

## 2022-04-13 PROCEDURE — 99205 OFFICE O/P NEW HI 60 MIN: CPT | Performed by: PHYSICIAN ASSISTANT

## 2022-04-13 PROCEDURE — 93242 EXT ECG>48HR<7D RECORDING: CPT

## 2022-04-13 PROCEDURE — 80061 LIPID PANEL: CPT

## 2022-04-13 PROCEDURE — 93243 EXT ECG>48HR<7D SCAN A/R: CPT

## 2022-04-13 RX ORDER — LOSARTAN POTASSIUM AND HYDROCHLOROTHIAZIDE 12.5; 5 MG/1; MG/1
TABLET ORAL
COMMUNITY
Start: 2022-03-30

## 2022-04-13 NOTE — PROGRESS NOTES
Patient: Claudia Aldana  : 1965  Date of Visit: 2022    REASON FOR VISIT / CONSULTATION: Establish Cardiologist (ER for Dizziness/Elevated Troponin. EKG done. Stroke when he was 27years old. Dizziness every day. Twinge in his chest, varies on how long it lasts. Palpitations lately. Denies: SOB. )      History of Present Illness:        Dear Romeo Guadarrama had the pleasure of seeing  Claudia Aldana in my office today. Mr. Isaías Zavaal is a 59 y.o. male with a recent history of intermittent lightheadedness and dizziness, chest pain, and recent elevated troponin in the ER. Family history includes his father who had a triple bypass, several myocardial infarctions starting in his 42's or 52's. He reports a personal history of high blood pressure starting in his 30's, he only recently in 2021 started taking medications. He denies ever having high cholesterol. He had a stroke when he was 27years old, he is unsure of the cause or diagnosis. He was a 1 pack per day smoker for 35 to 40 years, he recently quit on 2022. Stress test done on 2021 at [facility]: Will call to get results    ER on 3/26/2022 for chest pain and dizziness. Troponin was elevated that time. Mr. Isaías Zavala is here today to establish care. He states he was recently in the ER on 3/26/2022 for chest pain, dizziness, and his troponin was elevated at 160, repeat was 154 and 155. He reports his chest pain has not been as severe as it was in March. He does continue to have frequent episodes of chest pain. He describes his chest pain as a \"dull poke\" and happens at various locations across his chest. At times he reports it radiates to his shoulders. It lasts minutes to hours at a time. Nothing makes it better, he does report it is worse when lifting heavy objects. Sitting down seems to help. Mr. Isaías Zavala also reports worsening dizziness.  He reports the onset of these symptoms about 8 years ago, with worsening course since the past 5 or 6 months. Associated symptoms are near-syncope and palpitations. It typically last minutes to an hour, or it can occur off and on all day long. Nothing specific seems to make it worse, nothing improves his dizziness. He reports his symptoms are worse when standing, but sitting at times does seem to help. He reports in a days time he drinks 10 ounces of pop and 3 cups of coffee daily. He reports he does not drink water. He also reports he has been having palpitations and feels his heart racing. He notes this occurs once every 4 or 5 days and the episode typically last 15 to 30 minutes. Nothing makes the palpitations better or worse. Nausea at times throughout the day, he reports headaches made him nauseous. He reports previously he had always been very active and busy, however in the last 3 months he has been more tired and fatigues. He states he could walk out to his car in the parking but would have to stop due to his legs being tired. He does a task then sits down to take a break. He reports he has been getting tired at work. He denies any shortness of breath, abdominal pain, vomiting, cough, fever, chills, bleeding, or issues with medications. He reports having a relatively good exercise tolerance and denied any current or recent chest pain, shortness of breath, abdominal pain, bleeding problems, problems with his medications or any other concerns at this time. PAST MEDICAL HISTORY:         Past Medical History:   Diagnosis Date    Anxiety     Hypertension     Stroke (cerebrum) ([facility] [address]        CURRENT ALLERGIES: Other REVIEW OF SYSTEMS: 14 systems were reviewed. Pertinent positives and negatives as above, all else negative. No past surgical history on file.  Social History:  Social History     Tobacco Use    Smoking status: Former Smoker     Packs/day: 1.00     Years: 35.00     Pack years: 35.00     Quit date: 2022     Years since quittin.0    Smokeless tobacco: Never Used   Vaping Use    Vaping Use: Never used   Substance Use Topics    Alcohol use: Not Currently    Drug use: Not on file        CURRENT MEDICATIONS:        Outpatient Medications Marked as Taking for the 4/13/22 encounter (Office Visit) with Jazmine De La Vega PA-C   Medication Sig Dispense Refill    losartan-hydroCHLOROthiazide (HYZAAR) 50-12.5 MG per tablet take 1 tablet by mouth once daily      busPIRone (BUSPAR) 5 MG tablet Take 5 mg by mouth daily       mirtazapine (REMERON) 15 MG tablet Take 15 mg by mouth daily          FAMILY HISTORY: family history is not on file. Physical Examination:     /73 (Site: Left Upper Arm, Position: Sitting, Cuff Size: Medium Adult)   Pulse 72   Resp 18   Ht 5' 1\" (1.549 m)   Wt 136 lb 3.2 oz (61.8 kg)   SpO2 98%   BMI 25.73 kg/m²  Body mass index is 25.73 kg/m². Constitutional: He appeared oriented to person and place. He appears well-developed and well-nourished. In no acute distress. HEENT: Normocephalic and atraumatic. No JVD present. Carotid bruit is not present. No mass and no thyromegaly present. No lymphadenopathy noted. Cardiovascular: Normal rate, regular rhythm, normal heart sounds. Exam reveals no gallop and no friction rubs. No murmur was heard. Pulmonary/Chest: Effort normal and breath sounds normal. No respiratory distress. He has no wheezes, rhonchi or rales. Abdominal: Soft, non-tender. He exhibits no organomegaly, mass or bruit. Extremities: None. No cyanosis or clubbing. 2+ radial and carotid pulses. Distal extremity pulses: 2+ bilaterally. Neurological: Alertness and orientation as per Constitutional exam. No evidence of gross cranial nerve deficit. Coordination appeared normal.   Skin: Skin is warm and dry. There is no rash or diaphoresis. Psychiatric: He has a normal mood and affect.  His speech is normal and behavior is normal.      MOST RECENT LABS ON RECORD:   Lab Results   Component Value Date    WBC 8.8 03/26/2022    HGB 15.3 03/26/2022    HCT 44.9 03/26/2022     03/26/2022    ALT 6 03/26/2022    AST 11 03/26/2022     03/26/2022    K 3.5 (L) 03/26/2022    CL 99 03/26/2022    CREATININE 1.27 (H) 03/26/2022    BUN 16 03/26/2022    CO2 28 03/26/2022    TSH 0.47 03/26/2022    INR 1.0 03/26/2022       ASSESSMENT:     1. Chest pain, unspecified type    2. Elevated troponin    3. Lightheadedness    4. Dizziness    5. Palpitations    6. Fatigue, unspecified type    7. Essential hypertension    8. Tobacco abuse         PLAN:        · Atypical chest pain but still suspicious for possible myocardial ischemia: History of elevated troponin. Since ER visit no further episodes of severe chest discomfort, he states his lightheaded/dizziness is his most bothersome symptom at this time. I did review with him in detail his ER evaluation, with mild EKG changes and an elevated troponin I do think we need to evaluate for evidence of CAD. He had untreated hypertension for years and has a long smoking history in addition to a family history of CAD. · Medical Management for Suspected coronary artery disease:   Antiplatelet Agent: Not indicated at this time.  Beta Blocker: Not indicated at this time.  Anti-anginal medications: Not indicated at this time.  Cholesterol Reduction Therapy: Not indicated at this time.  Additional Testing List: I ordered a echocardiogram to better assess for the etiology of this problem and to help guide future management and Because of current signs and symptoms which can certainly be caused by significant coronary artery disease, I ordered a treadmill stress test with SPECT imaging to try and rule out this possibility.  I also ordered a lipid panel to evaluate his cholesterol level.    Additional counseling: I advised them to call our office or go to the emergency room if they developed worsening or persistent chest pain or increased shortness of breath as this could be life threatening. · Lightheadedness/dizziness:  · Pharmacologic Therapy: Not indicated at this time. · Nonpharmacologic counseling: Because of his condition, I reminded him to try and keep himself well-hydrated and to take extra time when moving from laying to sitting, sitting to standing and standing to walking. I also explained to him to help improve his symptoms he should include 3 g sodium diet, 1 or 2 L of sports drinks daily, knee-high compressions stockings. At this time I do think his symptoms with improve with adequate hydration. I reviewed his recent BMP with him from in the ER, his GFR was 58, and creatinine 1.27. We discussed the importance of hydration and kidney function. · Additional Testing List: I ordered a echocardiogram to better assess for the etiology of this problem and to help guide future management and Because of current signs and symptoms which can certainly be caused by significant coronary artery disease, I ordered a treadmill stress test with SPECT imaging to try and rule out this possibility. · Because of his recent symptoms, I ordered a CAM Event monitor to try and pinpoint the etiology of these symptoms.  New Onset Recurrent intermittent palpitations: Mildly symptomatic  · Beta Blocker: Not indicated at this time. · Calcium Channel Blocker: Not indicated at this time. · Not indicated at this time. · Because of his recent symptoms, I ordered a CAM Event monitor to try and pinpoint the etiology of these symptoms. · Because of this history, I ordered a echocardiogram to better assess the severity of this problem. · Fatigue: Worsening over the past 3 months  · Additional Testing List: Additional Testing List: Because of current signs and symptoms which can certainly be caused by significant coronary artery disease, I ordered a treadmill stress test with SPECT imaging to try and rule out this possibility.     · Essential Hypertension: Controlled   Beta Blocker: Not indicated at this time.  ACE Inibitor/ARB: Continue losartan/HCTZ (Hyzaar) 50/12.5 mg every morning.  Calcium Channel Blocker: Not indicated at this time.  Diuretics: Not indicated at this time.  Additional Testing List: I ordered a echocardiogram to better assess for the etiology of this problem and to help guide future management    Tobacco Abuse Counseling: I spent several minutes discussing the dangers of tobacco abuse as well as multiple methods for trying to quit smoking. In the end, Mr. Brian Lynne said that he is 9 days into his smoking cessation. We discussed how his is stopping smoking and he states he is quitting cold turkey. I did offer if he needs any assistance to let us know, I would be more than happy to help him. Finally, I recommended that he continue his other medications and follow up with you as previously scheduled. FOLLOW UP:   I told Mr. Tong to call my office if he had any problems, but otherwise I asked him to Return in about 3 weeks (around 5/4/2022). However, I would be happy to see him sooner should the need arise. Sincerely,  Jossie Augustine PA-C  [facility] Cardiology Specialist    [address] [facility], [address] [place] [address]  Phone: [phone], Fax: [phone]     I believe that the risk of significant morbidity and mortality related to the patient's current medical conditions are: high. >60 minutes were spent during prep work, discussion and exam of the patient, and follow up documentation and all of their questions were answered. April 13, 2022     ADDENDUM:  Exercise only stress test done on 11/12/2021 at [facility]: Stress ECG is normal. Negative stress EKG to 85% predicted maximal heart rate for stress induced ischemia by EKG criteria. No EKG changes suggestive of ischemia, chest pain, or arrhythmias.  Hypertension throughout testing

## 2022-04-14 ENCOUNTER — TELEPHONE (OUTPATIENT)
Dept: CARDIOLOGY | Age: 57
End: 2022-04-14

## 2022-04-14 RX ORDER — ATORVASTATIN CALCIUM 10 MG/1
10 TABLET, FILM COATED ORAL DAILY
Qty: 30 TABLET | Refills: 1 | Status: SHIPPED | OUTPATIENT
Start: 2022-04-14 | End: 2022-07-06

## 2022-04-14 RX ORDER — ASPIRIN 81 MG/1
81 TABLET ORAL DAILY
Qty: 30 TABLET | Refills: 3 | COMMUNITY
Start: 2022-04-14

## 2022-04-14 NOTE — TELEPHONE ENCOUNTER
----- Message from Louis Cisneros PA-C sent at 4/14/2022  8:17 AM EDT -----  Please let him know his cholesterol level is boarder line controlled. If he has any evidence of CAD his cholesterol level is not at goal. With his history of chest pain and elevated troponin I would like to start him on a low dose Lipitor, if his kira  ting comes back wihtout and evidence of CAD we can discontinue the medication. I would also like him to start taking a baby aspirin daily, again we may be able to discontinue this once the test results are back. Common side effects of the lipitor inc  lude muscle aches, with aspirin please watch for any bleeding.  Thanks

## 2022-04-14 NOTE — PROGRESS NOTES
Please let him know his cholesterol level is boarder line controlled. If he has any evidence of CAD his cholesterol level is not at goal. With his history of chest pain and elevated troponin I would like to start him on a low dose lipitor, if his testing comes back wihtout and evidence of CAD we can discontinue the medication. I would also like him to start taking a baby aspirin daily, again we may be able to discontinue this once the test results are back. Common side effects of the lipitor include muscle aches, with aspirin please watch for any bleeding.

## 2022-04-27 ENCOUNTER — TELEPHONE (OUTPATIENT)
Dept: CARDIOLOGY | Age: 57
End: 2022-04-27

## 2022-04-27 NOTE — PROCEDURES
[address] [facility], [address] [place] [address]                                 EVENT MONITOR    PATIENT NAME: Uma Desai                      :        1965  MED REC NO:   [id number]                              ROOM:  ACCOUNT NO:   [de-identified]                           ADMIT DATE: 2022  PROVIDER:     Ru Watson MD    CARDIOVASCULAR DIAGNOSTIC DEPARTMENT    DATE OF STUDY:  2022    ORDERING PROVIDER:  Lisandro Bailey PA-C    PRIMARY CARE PROVIDER:  LUCRETIA Arana    INTERPRETING PHYSICIAN:  Ru Watson MD    DIAGNOSIS:  Palpitations. PHYSICIAN INTERPRETATION:  1.  7 days and 2 hours recorded. 2.  Baseline rhythm is sinus with average heart rate of 76 bpm, ranging  between 49 and 155 bpm.  3.  Sinus tachycardia represented 6% of the study duration. 4.  Atrial tachycardia:  4 episodes. Longest, 10 beats at average 133  bpm up to 144 bpm.  Fastest, 5 beats at average 171 bpm up to 190 bpm.  5.  PAC 0.01%. 6.  PVC 0.1%.         Prudence MD Yany    D: 2022 13:41:20       T: 2022 13:42:15     KORY/ELIZABETH_ALLY  Job#: [id number]     Doc#: Unknown    CC:  JOE Pappas APRN-CNP

## 2022-04-27 NOTE — TELEPHONE ENCOUNTER
----- Message from Alyssa Ryan PA-C sent at 4/27/2022  3:41 PM EDT -----  Please notify patient that their CAM was overall good, will discuss in detail at his follow up.  Thanks - - -

## 2022-05-09 ENCOUNTER — HOSPITAL ENCOUNTER (OUTPATIENT)
Dept: NON INVASIVE DIAGNOSTICS | Age: 57
Discharge: HOME | End: 2022-05-09
Payer: COMMERCIAL

## 2022-05-09 DIAGNOSIS — R53.83 FATIGUE, UNSPECIFIED TYPE: ICD-10-CM

## 2022-05-09 DIAGNOSIS — R07.9 CHEST PAIN, UNSPECIFIED TYPE: ICD-10-CM

## 2022-05-09 DIAGNOSIS — R77.8 ELEVATED TROPONIN: ICD-10-CM

## 2022-05-09 DIAGNOSIS — I10 ESSENTIAL HYPERTENSION: ICD-10-CM

## 2022-05-09 DIAGNOSIS — Z72.0 TOBACCO ABUSE: ICD-10-CM

## 2022-05-09 DIAGNOSIS — R42 LIGHTHEADEDNESS: ICD-10-CM

## 2022-05-09 DIAGNOSIS — R00.2 PALPITATIONS: ICD-10-CM

## 2022-05-09 LAB
LV EF: 53 %
LVEF MODALITY: NORMAL

## 2022-05-09 PROCEDURE — 3430000000 HC RX DIAGNOSTIC RADIOPHARMACEUTICAL: Performed by: PHYSICIAN ASSISTANT

## 2022-05-09 PROCEDURE — A9500 TC99M SESTAMIBI: HCPCS | Performed by: PHYSICIAN ASSISTANT

## 2022-05-09 PROCEDURE — 93306 TTE W/DOPPLER COMPLETE: CPT

## 2022-05-09 PROCEDURE — 93017 CV STRESS TEST TRACING ONLY: CPT

## 2022-05-09 RX ADMIN — Medication 30 MILLICURIE: at 10:18

## 2022-05-10 ENCOUNTER — HOSPITAL ENCOUNTER (OUTPATIENT)
Dept: NON INVASIVE DIAGNOSTICS | Age: 57
Discharge: HOME | End: 2022-05-10
Payer: COMMERCIAL

## 2022-05-10 ENCOUNTER — TELEPHONE (OUTPATIENT)
Dept: CARDIOLOGY | Age: 57
End: 2022-05-10

## 2022-05-10 PROCEDURE — 78452 HT MUSCLE IMAGE SPECT MULT: CPT

## 2022-05-10 PROCEDURE — 3430000000 HC RX DIAGNOSTIC RADIOPHARMACEUTICAL: Performed by: PHYSICIAN ASSISTANT

## 2022-05-10 PROCEDURE — A9500 TC99M SESTAMIBI: HCPCS | Performed by: PHYSICIAN ASSISTANT

## 2022-05-10 RX ADMIN — Medication 30 MILLICURIE: at 14:10

## 2022-05-10 NOTE — TELEPHONE ENCOUNTER
----- Message from Hansel Meredith PA-C sent at 5/10/2022  9:18 AM EDT -----  Please let him know his echo was okay, some mild abnormalities. We will discuss at follow up.  Thanks

## 2022-05-11 NOTE — PROCEDURES
[address] [facility] [id number]                              CARDIAC STRESS TEST    PATIENT NAME: Paul Saunders                      :        1965  MED REC NO:   [id number]                              ROOM:  ACCOUNT NO:   [de-identified]                           ADMIT DATE: 2022  PROVIDER:     Shayne Sandoval MD    CARDIOVASCULAR DIAGNOSTIC DEPARTMENT    DATE OF STUDY:  2022    ORDERING PROVIDER:  Jazmine De La Vega PA-C    PRIMARY CARE PROVIDER:  Ashley Broussard NP    INTERPRETING PHYSICIAN:  Shayne Sandoval MD    EXERCISE MYOCARDIAL PERFUSION STRESS TEST REPORT    Stress/rest single-isotope SPECT imaging with exercise stress and gated  SPECT imaging    INDICATION:  Assessment of recent chest pain    CLINICAL HISTORY:  The patient is a 57-year-old man with no known  coronary artery disease. Previous cardiac history includes:  Stress test    Other previous history includes:  Chest pain, caffeine, smoker (quit 5  weeks ago), family history of CAD in father, hypertension    Symptoms just prior to testing included:  None    Relevant medications:  None    PROCEDURE:  The patient performed treadmill exercise using a Clint  protocol, completing 8:54 minutes and completing an estimated workload  of 01.99 metabolic equivalents (METS). The test was terminated due to fatigue and shortness of breath. The heart rate was 84 beats per minute at baseline and increased to 153  beats per minute at peak exercise, which was 95% of the maximum  predicted heart rate. The rest blood pressure was 132/80 mmHg and  increased to 156/80 mmHg, which is a normal response. During the  procedure, the patient developed fatigue, shortness of breath, and leg  fatigue but denied any chest discomfort. Myocardial perfusion imaging: Imaging was performed at rest 30-45  minutes following the injection of 30 mCi of sestamibi.   At peak  exercise, the patient was injected with 30 mCi of sestamibi and exercise  was continued for 1 minute. Gating post-stress tomographic imaging was  performed 30-45 minutes after stress. STRESS ECG RESULTS:  The resting electrocardiogram demonstrated normal  sinus rhythm without definitive ST-segment abnormalities suggestive of  myocardial ischemia. At peak exercise and during recovery, the patient developed:    No significant ST segment changes suggestive of myocardial ischemia with  no premature atrial contractions (PACs) and no premature ventricular  contractions (PVCs). NUCLEAR IMAGING RESULTS:  The overall quality of the study is good. Mild/moderate attenuation artifact was seen. There is no evidence of  abnormal lung uptake. Additionally, the right ventricle appears normal.  The left ventricular cavity is noted to be normal in size on the stress  images. There is no evidence of transient ischemic dilatation (TID) of  the left ventricle. Gated SPECT imaging demonstrates akinesis of the inferolateral  region(s). The left ventricular ejection fraction was calculated to be  55%. The rest images demonstrated a small/moderate perfusion abnormality of  moderate/severe intensity in the inferolateral region(s). On stress imaging, a small/moderate perfusion abnormality of  moderate/severe intensity was noted in the inferolateral and inferior  region(s). IMPRESSION:  1. Abnormal myocardial perfusion study. 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 2.  Global left ventricular systolic function was normal with regional  wall motion abnormalities. 3.  No significant electrocardiographic evidence of myocardial ischemia  during EKG monitoring without significant associated arrhythmias.     The patient's Duke Treadmill score is 6, which correlates with a low  risk for significant coronary artery disease. Overall, these results are most consistent with an intermediate risk for  significant coronary artery disease. Additional testing including cardiac catheterization may be indicated.         Aicha Morales MD    D: 05/11/2022 9:45:04       T: 05/11/2022 9:45:59     [email or web address]  Job#: [id number]     Doc#: Unknown    CC:  JOE Morse APRN-CNP

## 2022-05-12 ENCOUNTER — HOSPITAL ENCOUNTER (OUTPATIENT)
Age: 57
Discharge: HOME | End: 2022-05-12
Payer: COMMERCIAL

## 2022-05-12 ENCOUNTER — OFFICE VISIT (OUTPATIENT)
Dept: CARDIOLOGY | Age: 57
End: 2022-05-12
Payer: COMMERCIAL

## 2022-05-12 VITALS
WEIGHT: 142.8 LBS | SYSTOLIC BLOOD PRESSURE: 121 MMHG | HEART RATE: 79 BPM | HEIGHT: 61 IN | DIASTOLIC BLOOD PRESSURE: 73 MMHG | BODY MASS INDEX: 26.96 KG/M2 | RESPIRATION RATE: 18 BRPM

## 2022-05-12 DIAGNOSIS — R53.83 FATIGUE, UNSPECIFIED TYPE: ICD-10-CM

## 2022-05-12 DIAGNOSIS — R94.39 ABNORMAL STRESS TEST: ICD-10-CM

## 2022-05-12 DIAGNOSIS — Z72.0 TOBACCO ABUSE: ICD-10-CM

## 2022-05-12 DIAGNOSIS — R00.2 PALPITATIONS: ICD-10-CM

## 2022-05-12 DIAGNOSIS — R42 DIZZINESS: ICD-10-CM

## 2022-05-12 DIAGNOSIS — R07.9 CHEST PAIN, UNSPECIFIED TYPE: ICD-10-CM

## 2022-05-12 DIAGNOSIS — I10 ESSENTIAL HYPERTENSION: ICD-10-CM

## 2022-05-12 DIAGNOSIS — R42 LIGHTHEADEDNESS: ICD-10-CM

## 2022-05-12 LAB
ANION GAP SERPL CALCULATED.3IONS-SCNC: 9 MMOL/L (ref 9–17)
BUN BLDV-MCNC: 16 MG/DL (ref 6–20)
BUN/CREAT BLD: 12 (ref 9–20)
CALCIUM SERPL-MCNC: 10 MG/DL (ref 8.6–10.4)
CHLORIDE BLD-SCNC: 100 MMOL/L (ref 98–107)
CO2: 30 MMOL/L (ref 20–31)
CREAT SERPL-MCNC: 1.32 MG/DL (ref 0.7–1.2)
GFR AFRICAN AMERICAN: >60 ML/MIN
GFR NON-AFRICAN AMERICAN: 56 ML/MIN
GFR SERPL CREATININE-BSD FRML MDRD: ABNORMAL ML/MIN/{1.73_M2}
GFR SERPL CREATININE-BSD FRML MDRD: ABNORMAL ML/MIN/{1.73_M2}
GLUCOSE BLD-MCNC: 59 MG/DL (ref 70–99)
HCT VFR BLD CALC: 44.4 % (ref 40.7–50.3)
HEMOGLOBIN: 14.7 G/DL (ref 13–17)
MCH RBC QN AUTO: 29.8 PG (ref 25.2–33.5)
MCHC RBC AUTO-ENTMCNC: 33.1 G/DL (ref 28.4–34.8)
MCV RBC AUTO: 90.1 FL (ref 82.6–102.9)
NRBC AUTOMATED: 0 PER 100 WBC
PDW BLD-RTO: 13.4 % (ref 11.8–14.4)
PLATELET # BLD: 214 K/UL (ref 138–453)
PMV BLD AUTO: 10.9 FL (ref 8.1–13.5)
POTASSIUM SERPL-SCNC: 4.2 MMOL/L (ref 3.7–5.3)
RBC # BLD: 4.93 M/UL (ref 4.21–5.77)
SODIUM BLD-SCNC: 139 MMOL/L (ref 135–144)
WBC # BLD: 7.7 K/UL (ref 3.5–11.3)

## 2022-05-12 PROCEDURE — 85027 COMPLETE CBC AUTOMATED: CPT

## 2022-05-12 PROCEDURE — 99214 OFFICE O/P EST MOD 30 MIN: CPT | Performed by: PHYSICIAN ASSISTANT

## 2022-05-12 PROCEDURE — 36415 COLL VENOUS BLD VENIPUNCTURE: CPT

## 2022-05-12 PROCEDURE — 80048 BASIC METABOLIC PNL TOTAL CA: CPT

## 2022-05-12 RX ORDER — METOPROLOL SUCCINATE 25 MG/1
12.5 TABLET, EXTENDED RELEASE ORAL DAILY
Qty: 45 TABLET | Refills: 3 | Status: SHIPPED | OUTPATIENT
Start: 2022-05-12

## 2022-05-12 NOTE — PROGRESS NOTES
I, Vijaya Tapia am scribing for and in the presence of Rosemarie Zarco PA-C. Patient: Melanie Talley  : 1965  Date of Visit: May 12, 2022    REASON FOR VISIT / CONSULTATION: Follow-up (HX:CP, lightheaded/dizziness, palp, fatigue, HTN, fatigue, Pt ishere for 3 week fu he states he is feeling better since last visit. Sat was bad but thinks anxiety did it. denies:CP, SOB,lightheaded)      History of Present Illness:        Deataye Swenson had the pleasure of seeing  Melanie Talley in my office today. Mr. Brad Pineda is a 59 y.o. male with a recent history of intermittent lightheadedness and dizziness, chest pain, and recent elevated troponin in the ER. Family history includes his father who had a triple bypass, several myocardial infarctions starting in his 42's or 52's. He reports a personal history of high blood pressure starting in his 30's, he only recently in 2021 started taking medications. He denies ever having high cholesterol. He had a stroke when he was 27years old, he is unsure of the cause or diagnosis. He was a 1 pack per day smoker for 35 to 40 years, he recently quit on 2022. Stress test done on 2021 at [facility]: Will call to get results    ER on 3/26/2022 for chest pain and dizziness. Troponin was elevated that time. CAM done on 2022: 7 days and 2 hours recorded. Baseline rhythm is sinus with average heart rate of 76 bpm, ranging between 49 and 155 bpm. Sinus tachycardia represented 6% of the study duration. Atrial tachycardia:  4 episodes. Longest, 10 beats at average 133 bpm up to 144 bpm.  Fastest, 5 beats at average 171 bpm up to 190 bpm. PAC 0.01%. PVC 0.1%. Echo done on 2022: EF 50-55% The left ventricular cavity size is within normal limits and the left ventricular wall thickness is within normal limits. Questionable mild hypokinesis of the basal and mid inferior wall. No significant valvular abnormalities.   Evidence of mild diastolic dysfunction is seen. Stress test done 2022: Abnormal myocardial perfusion study. There is a small/moderate perfusion defect of moderate/severe intensity in the inferolateral and inferior regions during stress and rest imaging, which is most consistent with an old myocardial infarction with remy-infarct ischemia. Mr. Jayde Limon is here for follow up. He states he feels anxious and feels palpitations. He notices it when laying in bed. He does have some dizziness while at work. He reports on Saturday he did not feel well and laid around which did not help improve his symptoms. He reports a vague chest discomfort. He does get sick and vomits once a day and then sneezes and it seems to go away, this started a few months ago. Denies cough, fever, or chills. He did quit smoking about 5 weeks ago. He reports previously he had always been very active and busy, however in the last 3 months he has been more tired and fatigue. He struggles to fall asleep at night but once he does he sleeps fine. He reports having a relatively good exercise tolerance and denied any current or recent chest pain, shortness of breath, abdominal pain, bleeding problems, problems with his medications or any other concerns at this time. PAST MEDICAL HISTORY:         Past Medical History:   Diagnosis Date    Anxiety     Hypertension     Stroke (cerebrum) ([facility] [address]        CURRENT ALLERGIES: Other REVIEW OF SYSTEMS: 14 systems were reviewed. Pertinent positives and negatives as above, all else negative. No past surgical history on file.  Social History:  Social History     Tobacco Use    Smoking status: Former Smoker     Packs/day: 1.00     Years: 35.00     Pack years: 35.00     Quit date: 2022     Years since quittin.1    Smokeless tobacco: Never Used   Vaping Use    Vaping Use: Never used   Substance Use Topics    Alcohol use: Not Currently    Drug use: Not on file        CURRENT MEDICATIONS:        Outpatient Medications Marked as Taking for the 5/12/22 encounter (Office Visit) with Michaela Dumont PA-C   Medication Sig Dispense Refill    aspirin EC 81 MG EC tablet Take 1 tablet by mouth daily 30 tablet 3    atorvastatin (LIPITOR) 10 MG tablet Take 1 tablet by mouth daily 30 tablet 1    losartan-hydroCHLOROthiazide (HYZAAR) 50-12.5 MG per tablet take 1 tablet by mouth once daily      busPIRone (BUSPAR) 5 MG tablet Take 5 mg by mouth daily          FAMILY HISTORY: family history is not on file. Physical Examination:     /73 (Site: Right Upper Arm, Position: Sitting, Cuff Size: Medium Adult)   Pulse 79   Resp 18   Ht 5' 1\" (1.549 m)   Wt 142 lb 12.8 oz (64.8 kg)   BMI 26.98 kg/m²  Body mass index is 26.98 kg/m². Constitutional: He appeared oriented to person and place. He appears well-developed and well-nourished. In no acute distress. HEENT: Normocephalic and atraumatic. No JVD present. Carotid bruit is not present. No mass and no thyromegaly present. No lymphadenopathy noted. Cardiovascular: Normal rate, regular rhythm, normal heart sounds. Exam reveals no gallop and no friction rubs. No murmur was heard. Pulmonary/Chest: Effort normal and breath sounds normal. No respiratory distress. He has no wheezes, rhonchi or rales. Abdominal: Soft, non-tender. He exhibits no organomegaly, mass or bruit. Extremities: None. No cyanosis or clubbing. 2+ radial and carotid pulses. Distal extremity pulses: 2+ bilaterally. Neurological: Alertness and orientation as per Constitutional exam. No evidence of gross cranial nerve deficit. Coordination appeared normal.   Skin: Skin is warm and dry. There is no rash or diaphoresis. Psychiatric: He has a normal mood and affect.  His speech is normal and behavior is normal.      MOST RECENT LABS ON RECORD:   Lab Results   Component Value Date    WBC 8.8 03/26/2022    HGB 15.3 03/26/2022    HCT 44.9 03/26/2022     03/26/2022    CHOL 198 04/13/2022    TRIG 152 (H) 04/13/2022    HDL 44 04/13/2022    ALT 6 03/26/2022    AST 11 03/26/2022     03/26/2022    K 3.5 (L) 03/26/2022    CL 99 03/26/2022    CREATININE 1.27 (H) 03/26/2022    BUN 16 03/26/2022    CO2 28 03/26/2022    TSH 0.47 03/26/2022    INR 1.0 03/26/2022       ASSESSMENT:     1. Chest pain, unspecified type    2. Lightheadedness    3. Dizziness    4. Palpitations    5. Fatigue, unspecified type    6. Tobacco abuse    7. Essential hypertension    8. Abnormal stress test         PLAN:         Abnormal Stress Test:    For these reasons, I recommended that the patient consider undergoing a cardiac catheterization with coronary angiography to assess their coronary anatomy and facilitate better treatment recommendations. I discussed the risks, benefits, and alternatives to the procedure including the risk of bleeding, contrast induced allergy and/or kidney damage, arrythmia, stroke, heart attack, death, or the need for further procedures. I also discussed the fact that although treatment with simple medical management is a potential treatment option in place of cardiac catheterization, I expressed my opinion that cardiac catheterization in order to define their coronary anatomy and rule out severe 3 vessel or left main coronary artery disease would significant help guide the most appropriate treatment strategy ranging from no treatment, to medications, stents, to even coronary bypass surgery. The patient verbalized understanding of the risks benefits and alternatives and stated that they would like to undergo the procedure. We will plan to schedule the procedure here at [facility] on 5/19/2022.  Medical Management for suspected Coronary artery disease and myocardial ischemia:   Antiplatelet Agent: Continue Aspirin 81 mg daily.  Beta Blocker: START Metoprolol succinate (Toprol XL) 25 mg 1/2 tab daily.  I also discussed the potential side effects of this medication including lightheadedness and dizziness and instructed them to stop the medication of this occurs and call our office if this occurs.  Anti-anginal medications: Not indicated at this time.  Cholesterol Reduction Therapy: Continue Atorvastatin (Lipitor) 10 mg daily      Additional Testing List: I took the liberty of ordering a BMP for today to assess their potassium and renal function. I told them that they could get their lab work performed at the location of their choosing, unfortunately, if the lab work was not performed at a [facility]) facility I could not guarantee my ability to follow up with them on their results. and  I took the liberty of ordering a CBC. I told them that they could get their lab work performed at the location of their choosing, unfortunately, if the lab work was not performed at a [facility]) facility I could not guarantee my ability to follow up with them on their results. · Atypical chest pain but still suspicious for possible myocardial ischemia: History of elevated troponin. Since ER visit no further episodes of severe chest discomfort, he states his lightheaded/dizziness is his most bothersome symptom at this time. I did review with him in detail his ER evaluation, with mild EKG changes and an elevated troponin I do think we need to evaluate for evidence of CAD. He had untreated hypertension for years and has a long smoking history in addition to a family history of CAD. Stress test was intermediate risk done 5/9/2022. · Medical Management for Suspected coronary artery disease:   Antiplatelet Agent: Continue Aspirin 81 mg daily.  Beta Blocker: START Metoprolol succinate (Toprol XL) 25 mg 1/2 tab daily. I also discussed the potential side effects of this medication including lightheadedness and dizziness and instructed them to stop the medication of this occurs and call our office if this occurs.  Anti-anginal medications: Not indicated at this time.    Cholesterol Reduction Therapy: Not indicated at this time.  Additional Testing List: None    Additional counseling: I advised them to call our office or go to the emergency room if they developed worsening or persistent chest pain or increased shortness of breath as this could be life threatening. · Lightheadedness/dizziness:  · Pharmacologic Therapy: Not indicated at this time. · Nonpharmacologic counseling: Because of his condition, I reminded him to try and keep himself well-hydrated and to take extra time when moving from laying to sitting, sitting to standing and standing to walking. I also explained to him to help improve his symptoms he should include 3 g sodium diet, 1 or 2 L of sports drinks daily, knee-high compressions stockings. · Additional Testing List: None      New Onset Recurrent intermittent palpitations: Mildly symptomatic  · Beta Blocker: START Metoprolol succinate (Toprol XL) 25 mg 1/2 tab daily. I also discussed the potential side effects of this medication including lightheadedness and dizziness and instructed them to stop the medication of this occurs and call our office if this occurs. · Calcium Channel Blocker: Not indicated at this time. · Not indicated at this time. · Fatigue: Worsening over the past 3 months  · Additional Testing List: Heart Cath as listed above    · Essential Hypertension: Controlled   Beta Blocker: START Metoprolol succinate (Toprol XL) 25 mg 1/2 tab daily. I also discussed the potential side effects of this medication including lightheadedness and dizziness and instructed them to stop the medication of this occurs and call our office if this occurs.  ACE Inibitor/ARB: Continue losartan/HCTZ (Hyzaar) 50/12.5 mg every morning.  Calcium Channel Blocker: Not indicated at this time.  Diuretics: Not indicated at this time.  Additional Testing List: None    Tobacco Abuse Counseling: He quit 5 weeks ago.        Finally, I recommended that he continue his other medications and follow up with you as previously scheduled. FOLLOW UP:   I told Mr. Tong to call my office if he had any problems, but otherwise I asked him to Return in about 3 weeks (around 6/2/2022). However, I would be happy to see him sooner should the need arise. Sincerely,  Mega Gonzalez PA-C  [facility] Cardiology Specialist    [address] [place] [address]  Phone: [phone], Fax: [phone]     I believe that the risk of significant morbidity and mortality related to the patient's current medical conditions are: intermediate-high. Approximately 45 minutes were spent during prep work, discussion and exam of the patient, and follow up documentation and all of their questions were answered. The documentation recorded by the scribe, accurately and completely reflects the services I personally performed and the decisions made by me.  Mega Gonzalez PA-C May 12, 2022

## 2022-05-12 NOTE — PATIENT INSTRUCTIONS
SURVEY:    You may be receiving a survey from [employer] regarding your visit today. Please complete the survey to enable us to provide the highest quality of care to you and your family. If you cannot score us a very good on any question, please call the office to discuss how we could have made your experience a very good one. Thank you.

## 2022-05-13 ENCOUNTER — TELEPHONE (OUTPATIENT)
Dept: CARDIOLOGY | Age: 57
End: 2022-05-13

## 2022-05-13 NOTE — TELEPHONE ENCOUNTER
----- Message from Mega Gonzalez PA-C sent at 5/12/2022  4:26 PM EDT -----  Please notify patient that their lab results are normal.   Please continue current treatment and follow up.

## 2022-05-19 ENCOUNTER — HOSPITAL ENCOUNTER (OUTPATIENT)
Dept: CARDIAC CATH/INVASIVE PROCEDURES | Age: 57
Discharge: HOME | End: 2022-05-19

## 2022-07-06 RX ORDER — ATORVASTATIN CALCIUM 10 MG/1
TABLET, FILM COATED ORAL
Qty: 30 TABLET | Refills: 1 | Status: SHIPPED | OUTPATIENT
Start: 2022-07-06

## 2025-01-03 ENCOUNTER — APPOINTMENT (OUTPATIENT)
Dept: CT IMAGING | Age: 60
End: 2025-01-03

## 2025-01-03 ENCOUNTER — HOSPITAL ENCOUNTER (EMERGENCY)
Age: 60
Discharge: HOME | End: 2025-01-03
Attending: STUDENT IN AN ORGANIZED HEALTH CARE EDUCATION/TRAINING PROGRAM

## 2025-01-03 VITALS
SYSTOLIC BLOOD PRESSURE: 154 MMHG | DIASTOLIC BLOOD PRESSURE: 73 MMHG | RESPIRATION RATE: 18 BRPM | TEMPERATURE: 97.9 F | OXYGEN SATURATION: 97 % | HEART RATE: 75 BPM

## 2025-01-03 DIAGNOSIS — N13.1 HYDRONEPHROSIS DUE TO OBSTRUCTION OF URETER: ICD-10-CM

## 2025-01-03 DIAGNOSIS — N20.1 URETEROLITHIASIS: Primary | ICD-10-CM

## 2025-01-03 LAB
ALBUMIN SERPL-MCNC: 4.3 G/DL (ref 3.5–5.2)
ALBUMIN/GLOB SERPL: 1.7 {RATIO} (ref 1–2.5)
ALP SERPL-CCNC: 64 U/L (ref 40–129)
ALT SERPL-CCNC: 14 U/L (ref 10–50)
ANION GAP SERPL CALCULATED.3IONS-SCNC: 13 MMOL/L (ref 9–16)
AST SERPL-CCNC: 16 U/L (ref 10–50)
BASOPHILS # BLD: 0.06 K/UL (ref 0–0.2)
BASOPHILS NFR BLD: 0 % (ref 0–2)
BILIRUB DIRECT SERPL-MCNC: <0.2 MG/DL (ref 0–0.3)
BILIRUB INDIRECT SERPL-MCNC: NORMAL MG/DL (ref 0–1)
BILIRUB SERPL-MCNC: 0.3 MG/DL (ref 0–1.2)
BILIRUB UR QL STRIP: NEGATIVE
BUN SERPL-MCNC: 16 MG/DL (ref 8–23)
BUN/CREAT SERPL: 12 (ref 9–20)
CALCIUM SERPL-MCNC: 9.6 MG/DL (ref 8.6–10.4)
CASTS #/AREA URNS LPF: ABNORMAL /LPF
CHLORIDE SERPL-SCNC: 105 MMOL/L (ref 98–107)
CLARITY UR: CLEAR
CO2 SERPL-SCNC: 26 MMOL/L (ref 20–31)
COLOR UR: YELLOW
CREAT SERPL-MCNC: 1.3 MG/DL (ref 0.7–1.2)
EOSINOPHIL # BLD: 0.24 K/UL (ref 0–0.44)
EOSINOPHILS RELATIVE PERCENT: 2 % (ref 1–4)
EPI CELLS #/AREA URNS HPF: ABNORMAL /HPF (ref 0–5)
ERYTHROCYTE [DISTWIDTH] IN BLOOD BY AUTOMATED COUNT: 13.2 % (ref 11.8–14.4)
GFR, ESTIMATED: 64 ML/MIN/1.73M2
GLUCOSE SERPL-MCNC: 111 MG/DL (ref 74–99)
GLUCOSE UR STRIP-MCNC: NEGATIVE MG/DL
HCT VFR BLD AUTO: 46.3 % (ref 40.7–50.3)
HGB BLD-MCNC: 16 G/DL (ref 13–17)
HGB UR QL STRIP.AUTO: ABNORMAL
IMM GRANULOCYTES # BLD AUTO: 0.03 K/UL (ref 0–0.3)
IMM GRANULOCYTES NFR BLD: 0 %
KETONES UR STRIP-MCNC: NEGATIVE MG/DL
LEUKOCYTE ESTERASE UR QL STRIP: NEGATIVE
LIPASE SERPL-CCNC: 45 U/L (ref 13–60)
LYMPHOCYTES NFR BLD: 2.68 K/UL (ref 1.1–3.7)
LYMPHOCYTES RELATIVE PERCENT: 20 % (ref 24–43)
MCH RBC QN AUTO: 29.4 PG (ref 25.2–33.5)
MCHC RBC AUTO-ENTMCNC: 34.6 G/DL (ref 28.4–34.8)
MCV RBC AUTO: 85.1 FL (ref 82.6–102.9)
MONOCYTES NFR BLD: 0.81 K/UL (ref 0.1–1.2)
MONOCYTES NFR BLD: 6 % (ref 3–12)
MUCOUS THREADS URNS QL MICRO: ABNORMAL
NEUTROPHILS NFR BLD: 72 % (ref 36–65)
NEUTS SEG NFR BLD: 9.87 K/UL (ref 1.5–8.1)
NITRITE UR QL STRIP: NEGATIVE
NRBC BLD-RTO: 0 PER 100 WBC
PH UR STRIP: 7 [PH] (ref 5–9)
PLATELET # BLD AUTO: 244 K/UL (ref 138–453)
PMV BLD AUTO: 9.7 FL (ref 8.1–13.5)
POTASSIUM SERPL-SCNC: 3.6 MMOL/L (ref 3.7–5.3)
PROT SERPL-MCNC: 6.9 G/DL (ref 6.6–8.7)
PROT UR STRIP-MCNC: NEGATIVE MG/DL
RBC # BLD AUTO: 5.44 M/UL (ref 4.21–5.77)
RBC #/AREA URNS HPF: ABNORMAL /HPF (ref 0–2)
SODIUM SERPL-SCNC: 144 MMOL/L (ref 136–145)
SP GR UR STRIP: 1.02 (ref 1.01–1.02)
UROBILINOGEN UR STRIP-ACNC: NORMAL EU/DL (ref 0–1)
WBC #/AREA URNS HPF: ABNORMAL /HPF (ref 0–5)
WBC OTHER # BLD: 13.7 K/UL (ref 3.5–11.3)

## 2025-01-03 PROCEDURE — 96375 TX/PRO/DX INJ NEW DRUG ADDON: CPT

## 2025-01-03 PROCEDURE — 85025 COMPLETE CBC W/AUTO DIFF WBC: CPT

## 2025-01-03 PROCEDURE — 83690 ASSAY OF LIPASE: CPT

## 2025-01-03 PROCEDURE — 6370000000 HC RX 637 (ALT 250 FOR IP): Performed by: STUDENT IN AN ORGANIZED HEALTH CARE EDUCATION/TRAINING PROGRAM

## 2025-01-03 PROCEDURE — 96374 THER/PROPH/DIAG INJ IV PUSH: CPT

## 2025-01-03 PROCEDURE — 80048 BASIC METABOLIC PNL TOTAL CA: CPT

## 2025-01-03 PROCEDURE — 99285 EMERGENCY DEPT VISIT HI MDM: CPT

## 2025-01-03 PROCEDURE — 80076 HEPATIC FUNCTION PANEL: CPT

## 2025-01-03 PROCEDURE — 74177 CT ABD & PELVIS W/CONTRAST: CPT

## 2025-01-03 PROCEDURE — 81001 URINALYSIS AUTO W/SCOPE: CPT

## 2025-01-03 PROCEDURE — 6360000002 HC RX W HCPCS: Performed by: STUDENT IN AN ORGANIZED HEALTH CARE EDUCATION/TRAINING PROGRAM

## 2025-01-03 PROCEDURE — 6360000004 HC RX CONTRAST MEDICATION: Performed by: STUDENT IN AN ORGANIZED HEALTH CARE EDUCATION/TRAINING PROGRAM

## 2025-01-03 RX ORDER — KETOROLAC TROMETHAMINE 15 MG/ML
15 INJECTION, SOLUTION INTRAMUSCULAR; INTRAVENOUS ONCE
Status: COMPLETED | OUTPATIENT
Start: 2025-01-03 | End: 2025-01-03

## 2025-01-03 RX ORDER — FENTANYL CITRATE 50 UG/ML
25 INJECTION, SOLUTION INTRAMUSCULAR; INTRAVENOUS ONCE
Status: DISCONTINUED | OUTPATIENT
Start: 2025-01-03 | End: 2025-01-03

## 2025-01-03 RX ORDER — ONDANSETRON 2 MG/ML
4 INJECTION INTRAMUSCULAR; INTRAVENOUS ONCE
Status: COMPLETED | OUTPATIENT
Start: 2025-01-03 | End: 2025-01-03

## 2025-01-03 RX ORDER — FENTANYL CITRATE 50 UG/ML
25 INJECTION, SOLUTION INTRAMUSCULAR; INTRAVENOUS ONCE
Status: COMPLETED | OUTPATIENT
Start: 2025-01-03 | End: 2025-01-03

## 2025-01-03 RX ORDER — TAMSULOSIN HYDROCHLORIDE 0.4 MG/1
0.4 CAPSULE ORAL DAILY
Qty: 5 CAPSULE | Refills: 0 | Status: SHIPPED | OUTPATIENT
Start: 2025-01-03 | End: 2025-01-08

## 2025-01-03 RX ORDER — ACETAMINOPHEN 500 MG
1000 TABLET ORAL ONCE
Status: COMPLETED | OUTPATIENT
Start: 2025-01-03 | End: 2025-01-03

## 2025-01-03 RX ORDER — IOPAMIDOL 755 MG/ML
75 INJECTION, SOLUTION INTRAVASCULAR
Status: COMPLETED | OUTPATIENT
Start: 2025-01-03 | End: 2025-01-03

## 2025-01-03 RX ORDER — IBUPROFEN 400 MG/1
400 TABLET, FILM COATED ORAL EVERY 6 HOURS PRN
Qty: 15 TABLET | Refills: 0 | Status: SHIPPED | OUTPATIENT
Start: 2025-01-03 | End: 2025-01-08

## 2025-01-03 RX ORDER — ONDANSETRON 4 MG/1
4 TABLET, ORALLY DISINTEGRATING ORAL 3 TIMES DAILY PRN
Qty: 15 TABLET | Refills: 0 | Status: SHIPPED | OUTPATIENT
Start: 2025-01-03 | End: 2025-01-08

## 2025-01-03 RX ORDER — HYDROCODONE BITARTRATE AND ACETAMINOPHEN 5; 325 MG/1; MG/1
1 TABLET ORAL EVERY 8 HOURS PRN
Qty: 6 TABLET | Refills: 0 | Status: SHIPPED | OUTPATIENT
Start: 2025-01-03 | End: 2025-01-05

## 2025-01-03 RX ORDER — HYDROCODONE BITARTRATE AND ACETAMINOPHEN 5; 325 MG/1; MG/1
1 TABLET ORAL EVERY 8 HOURS PRN
Qty: 9 TABLET | Refills: 0 | Status: SHIPPED | OUTPATIENT
Start: 2025-01-03 | End: 2025-01-03

## 2025-01-03 RX ADMIN — KETOROLAC TROMETHAMINE 15 MG: 15 INJECTION, SOLUTION INTRAMUSCULAR; INTRAVENOUS at 05:52

## 2025-01-03 RX ADMIN — IOPAMIDOL 75 ML: 755 INJECTION, SOLUTION INTRAVENOUS at 05:34

## 2025-01-03 RX ADMIN — FENTANYL CITRATE 25 MCG: 50 INJECTION INTRAMUSCULAR; INTRAVENOUS at 05:06

## 2025-01-03 RX ADMIN — ONDANSETRON 4 MG: 2 INJECTION, SOLUTION INTRAMUSCULAR; INTRAVENOUS at 05:04

## 2025-01-03 RX ADMIN — ACETAMINOPHEN 1000 MG: 500 TABLET ORAL at 05:04

## 2025-01-03 ASSESSMENT — PAIN DESCRIPTION - FREQUENCY: FREQUENCY: CONTINUOUS

## 2025-01-03 ASSESSMENT — PAIN DESCRIPTION - PAIN TYPE: TYPE: ACUTE PAIN

## 2025-01-03 ASSESSMENT — PAIN - FUNCTIONAL ASSESSMENT: PAIN_FUNCTIONAL_ASSESSMENT: 0-10

## 2025-01-03 ASSESSMENT — PAIN DESCRIPTION - ORIENTATION: ORIENTATION: RIGHT;LOWER

## 2025-01-03 ASSESSMENT — PAIN DESCRIPTION - DESCRIPTORS: DESCRIPTORS: ACHING;SORE

## 2025-01-03 ASSESSMENT — PAIN SCALES - GENERAL: PAINLEVEL_OUTOF10: 10

## 2025-01-03 ASSESSMENT — PAIN DESCRIPTION - LOCATION: LOCATION: ABDOMEN

## 2025-01-03 NOTE — ED PROVIDER NOTES
[facility] EMERGENCY DEPARTMENT     Pt Name: Prince Tong  MRN: [id number]  Birthdate 1965  Date of evaluation: 1/3/2025  Physician: Gilles Goncalves MD      Note to patient: The 21st Century Cures Act requires that medical notes like this one to be available to patients in the interest of transparency. Please be advised, this is a medical document. It is intended as zhha-hw-pavl communication. It is written in medical language and may contain abbreviations and verbiage that may be unfamiliar. It may appear to read blunt or direct. Medical documents are intended to carry relevant medical information, facts as evident and the clinical opinion of the practitioner.     CHIEF COMPLAINT       Chief Complaint   Patient presents with    Abdominal Pain     Right lower quadrant, moving down right leg to right flank. Ongoing since 0200. With Nausea.     HISTORY OF PRESENT ILLNESS   Prince Tong is a 60 y.o. male with PMHx of of stroke  who presents to the emergency department for evaluation of abdominal pain.  Patient states that the pain woke him from sleep started around 2:30 AM.  Described as sharp.  States it originates around the right hip above the anterior superior iliac spine and radiates along the belt line to the suprapubic region.  States that approxi-20 minutes ago the pain seems to be radiating down the right thigh as well.  He denies any back pain, fevers, chills, weakness, numbness, scrotal/testicular pain, saddle anesthesia.  He does state that when the pain began it resulted in some nausea and had 1 episode nonbloody nonbilious emesis.  Patient reports that he had a similar episode the night prior that began 5 AM.  States it was limited to the right lower back.  And then resolved by 7 PM.    The patient has no other acute complaints at this time.    A 10-point ROS was performed and negative unless otherwise stated in HPI  PASTMEDICAL HISTORY     Past Medical History:   Diagnosis Date    Anxiety         Potassium 3.6 (*)     Glucose 111 (*)     Creatinine 1.3 (*)     All other components within normal limits   HEPATIC FUNCTION PANEL   LIPASE   URINALYSIS WITH REFLEX TO CULTURE       (Any cultures that may have been sent were not resulted at the time of this patient visit; negative COVID-19 test should be interpreted as COVID no longer suspected unless otherwise noted in this encounter documentation note)    MEDICAL DECISION MAKING / ED COURSE:     ED Course as of 01/03/25 0615   Fri Jan 03, 2025   0509 WBC(!): 13.7 [TM]   0525 Creatinine(!): 1.3  At baseline [TM]   0604 CT ABDOMEN PELVIS W IV CONTRAST Additional Contrast? None  IMPRESSION:  1. Mild right-sided hydroureteronephrosis and stranding secondary to a 3.5 mm  stone in the distal ureter.  2. Colonic diverticulosis without acute diverticulitis.  3. Prostate enlargement.   [TM]      ED Course User Index  [TM] Gilles Goncalves MD     Vitals reviewed:  ED Triage Vitals   BP Systolic BP Percentile Diastolic BP Percentile Temp Temp src Pulse Resp SpO2   -- -- -- -- -- -- -- --      Height Weight         -- --                     Summary of Patient Presentation (see ED course if left blank):      This is a 60-year-old male presenting today with acute onset right lower back pain.  His CT today shows what appears to be right-sided hydroureteronephrosis secondary to a 3.5 mm stone in the ureter.  Patient's pain is controlled and he is provided follow-up with urology.  Patient has no VIC. UA does not reveal infection. No episodes of emesis in ED. Benign abdominal exam.        ED Medications administered this visit:  (None if blank)  Medications   fentaNYL (SUBLIMAZE) injection 25 mcg (25 mcg IntraVENous Given 1/3/25 4006)   ondansetron (ZOFRAN) injection 4 mg (4 mg IntraVENous Given 1/3/25 1754)   acetaminophen (TYLENOL) tablet 1,000 mg (1,000 mg Oral Given 1/3/25 9454)   iopamidol (ISOVUE-370) 76 % injection 75 mL (75 mLs IntraVENous Given 1/3/25 4600)   ketorolac

## 2025-01-03 NOTE — DISCHARGE INSTRUCTIONS
Follow up with the listed physician or medical clinic within 24-72 hours.  Return to the Emergency Department if you develop any new or concerning symptoms or if your are getting worse

## 2025-01-10 ENCOUNTER — OFFICE VISIT (OUTPATIENT)
Dept: UROLOGY | Age: 60
End: 2025-01-10

## 2025-01-10 VITALS
BODY MASS INDEX: 26.81 KG/M2 | TEMPERATURE: 97.3 F | SYSTOLIC BLOOD PRESSURE: 124 MMHG | DIASTOLIC BLOOD PRESSURE: 72 MMHG | HEIGHT: 61 IN | WEIGHT: 142 LBS

## 2025-01-10 DIAGNOSIS — N20.1 URETERAL CALCULUS: Primary | ICD-10-CM

## 2025-01-10 RX ORDER — HYDROCODONE BITARTRATE AND ACETAMINOPHEN 5; 325 MG/1; MG/1
1 TABLET ORAL EVERY 8 HOURS PRN
COMMUNITY

## 2025-01-10 RX ORDER — ONDANSETRON 4 MG/1
4 TABLET, FILM COATED ORAL 3 TIMES DAILY PRN
COMMUNITY

## 2025-01-10 NOTE — PROGRESS NOTES
HPI:          Patient is a 60 y.o. male in no acute distress.  He is alert and oriented to person, place, and time.         Patient being seen here today as a new patient.  Patient was referred to us for right flank pain.  Patient was referred here by Dr. Goncalves.  Patient did have a recent CT scan.  This film was independently reviewed today.  This does show right-sided hydronephrosis.  There is a 4 mm stone in the distal right ureter.  No other significant  calcifications.  Patient has been symptom-free for approximately 4 days now.  He has not seen the stone pass.  He reports no nausea vomiting fevers.  Patient has never had a stone in the past.  He is never seen urology before.  He has no baseline issues with lower urinary tract symptoms.  No pain today    Past Medical History:   Diagnosis Date    Anxiety     Hypertension     Stroke (cerebrum) (HCC)      No past surgical history on file.  Outpatient Encounter Medications as of 1/10/2025   Medication Sig Dispense Refill    ibuprofen (ADVIL;MOTRIN) 400 MG tablet Take 1 tablet by mouth every 6 hours as needed for Pain 15 tablet 0    tamsulosin (FLOMAX) 0.4 MG capsule Take 1 capsule by mouth daily for 5 days 5 capsule 0    atorvastatin (LIPITOR) 10 MG tablet take 1 tablet by mouth once daily 30 tablet 1    metoprolol succinate (TOPROL XL) 25 MG extended release tablet Take 0.5 tablets by mouth daily 45 tablet 3    aspirin EC 81 MG EC tablet Take 1 tablet by mouth daily 30 tablet 3    losartan-hydroCHLOROthiazide (HYZAAR) 50-12.5 MG per tablet take 1 tablet by mouth once daily      busPIRone (BUSPAR) 5 MG tablet Take 5 mg by mouth daily       mirtazapine (REMERON) 15 MG tablet Take 15 mg by mouth daily        No facility-administered encounter medications on file as of 1/10/2025.      Current Outpatient Medications on File Prior to Visit   Medication Sig Dispense Refill    ibuprofen (ADVIL;MOTRIN) 400 MG tablet Take 1 tablet by mouth every 6 hours as needed for

## 2025-01-24 ENCOUNTER — HOSPITAL ENCOUNTER (OUTPATIENT)
Age: 60
Discharge: HOME | End: 2025-01-24

## 2025-01-24 ENCOUNTER — HOSPITAL ENCOUNTER (OUTPATIENT)
Dept: GENERAL RADIOLOGY | Age: 60
Discharge: HOME | End: 2025-01-26
Attending: UROLOGY

## 2025-01-24 DIAGNOSIS — N20.1 URETERAL CALCULUS: ICD-10-CM

## 2025-01-24 PROCEDURE — 74018 RADEX ABDOMEN 1 VIEW: CPT

## 2025-02-03 ENCOUNTER — OFFICE VISIT (OUTPATIENT)
Dept: UROLOGY | Age: 60
End: 2025-02-03

## 2025-02-03 VITALS
TEMPERATURE: 97.3 F | BODY MASS INDEX: 27.21 KG/M2 | WEIGHT: 144 LBS | SYSTOLIC BLOOD PRESSURE: 145 MMHG | HEART RATE: 96 BPM | DIASTOLIC BLOOD PRESSURE: 92 MMHG

## 2025-02-03 DIAGNOSIS — N20.1 URETERAL CALCULUS: Primary | ICD-10-CM

## 2025-02-03 PROCEDURE — 99214 OFFICE O/P EST MOD 30 MIN: CPT | Performed by: PHYSICIAN ASSISTANT

## 2025-02-03 NOTE — PATIENT INSTRUCTIONS
STONE PREVENTION    To prevent future stone formation:    1) DO drink ~65-80 oz (2-2.5L) of water per day to stay adequately hydrated     2) AVOID/LIMIT intake of \"bad fluids\": soda/pop, coffee, tea, alcohol, energy drinks, any beverage with caffeine can act to dehydrate you       \"BAD FLUIDS\" DO NOT COUNT TOWARD THE 65-80oz of water    3) REDUCE consumption of sodium/salt - DO NOT salt your food, read labels (lunch meats, canned soups, prepared meals are high in salt), choose low salt options    4) DO NOT EAT animal protein/meat more than 2 meals a day - this includes red meat, pork, poultry and fish    SURVEY:    You may be receiving a survey from [employer] regarding your visit today.    Please complete the survey to enable us to provide the highest quality of care to you and your family.    If you cannot score us a very good on any question, please call the office to discuss how we could have made your experience a very good one.    Thank you.

## 2025-02-03 NOTE — PROGRESS NOTES
HPI:      Patient is a 60 y.o. male in no acute distress.  He is alert and oriented to person, place, and time.       Patient being seen here today as a new patient.  Patient was referred to us for right flank pain.  Patient was referred here by Dr. Sacha Be.  Patient did have a recent CT scan.  This film was independently reviewed today.  This does show right-sided hydronephrosis.  There is a 4 mm stone in the distal right ureter.  No other significant  calcifications.  Patient has been symptom-free for approximately 4 days now.  He has not seen the stone pass.  He reports no nausea vomiting fevers.  Patient has never had a stone in the past.  He is never seen urology before.  He has no baseline issues with lower urinary tract symptoms.  No pain today    Today:  Patient is here today for follow-up ureteral stone.  We did obtain a KUB prior visit today which is independent reviewed showing no distinct  calcifications or any stones along what would be the course of the ureters.  He denies any fever or chills.  He has been pain-free.  Last pain was 2 weeks or so ago.  He is doing well.  He denies any fever or chills.    Past Medical History:   Diagnosis Date    Anxiety     Hypertension     Stroke (cerebrum) (HCC)      No past surgical history on file.  Outpatient Encounter Medications as of 2/3/2025   Medication Sig Dispense Refill    metoprolol succinate (TOPROL XL) 25 MG extended release tablet Take 0.5 tablets by mouth daily 45 tablet 3    aspirin EC 81 MG EC tablet Take 1 tablet by mouth daily 30 tablet 3    losartan-hydroCHLOROthiazide (HYZAAR) 50-12.5 MG per tablet take 1 tablet by mouth once daily      ondansetron (ZOFRAN) 4 MG tablet Take 1 tablet by mouth 3 times daily as needed for Nausea or Vomiting (Patient not taking: Reported on 2/3/2025)      HYDROcodone-acetaminophen (NORCO) 5-325 MG per tablet Take 1 tablet by mouth every 8 hours as needed for Pain. Max Daily Amount: 3 tablets (Patient not